# Patient Record
Sex: FEMALE | Race: ASIAN | NOT HISPANIC OR LATINO | URBAN - METROPOLITAN AREA
[De-identification: names, ages, dates, MRNs, and addresses within clinical notes are randomized per-mention and may not be internally consistent; named-entity substitution may affect disease eponyms.]

---

## 2017-06-01 ENCOUNTER — OUTPATIENT (OUTPATIENT)
Dept: OUTPATIENT SERVICES | Facility: HOSPITAL | Age: 52
LOS: 1 days | End: 2017-06-01
Payer: MEDICAID

## 2017-06-13 DIAGNOSIS — R69 ILLNESS, UNSPECIFIED: ICD-10-CM

## 2017-08-01 PROCEDURE — G9001: CPT

## 2022-09-20 ENCOUNTER — EMERGENCY (EMERGENCY)
Facility: HOSPITAL | Age: 57
LOS: 1 days | Discharge: HOME | End: 2022-09-20
Attending: EMERGENCY MEDICINE | Admitting: EMERGENCY MEDICINE

## 2022-09-20 VITALS
HEART RATE: 86 BPM | DIASTOLIC BLOOD PRESSURE: 104 MMHG | SYSTOLIC BLOOD PRESSURE: 180 MMHG | TEMPERATURE: 98 F | RESPIRATION RATE: 18 BRPM | OXYGEN SATURATION: 100 % | HEIGHT: 65 IN

## 2022-09-20 LAB
ALBUMIN SERPL ELPH-MCNC: 4.7 G/DL — SIGNIFICANT CHANGE UP (ref 3.3–5)
ALP SERPL-CCNC: 47 U/L — SIGNIFICANT CHANGE UP (ref 40–120)
ALT FLD-CCNC: 18 U/L — SIGNIFICANT CHANGE UP (ref 4–33)
ANION GAP SERPL CALC-SCNC: 11 MMOL/L — SIGNIFICANT CHANGE UP (ref 7–14)
APPEARANCE UR: CLEAR — SIGNIFICANT CHANGE UP
AST SERPL-CCNC: 22 U/L — SIGNIFICANT CHANGE UP (ref 4–32)
BASOPHILS # BLD AUTO: 0.04 K/UL — SIGNIFICANT CHANGE UP (ref 0–0.2)
BASOPHILS NFR BLD AUTO: 0.8 % — SIGNIFICANT CHANGE UP (ref 0–2)
BILIRUB SERPL-MCNC: 0.3 MG/DL — SIGNIFICANT CHANGE UP (ref 0.2–1.2)
BILIRUB UR-MCNC: NEGATIVE — SIGNIFICANT CHANGE UP
BLOOD GAS VENOUS COMPREHENSIVE RESULT: SIGNIFICANT CHANGE UP
BUN SERPL-MCNC: 17 MG/DL — SIGNIFICANT CHANGE UP (ref 7–23)
CALCIUM SERPL-MCNC: 9.1 MG/DL — SIGNIFICANT CHANGE UP (ref 8.4–10.5)
CHLORIDE SERPL-SCNC: 103 MMOL/L — SIGNIFICANT CHANGE UP (ref 98–107)
CO2 SERPL-SCNC: 24 MMOL/L — SIGNIFICANT CHANGE UP (ref 22–31)
COLOR SPEC: SIGNIFICANT CHANGE UP
CREAT SERPL-MCNC: 0.58 MG/DL — SIGNIFICANT CHANGE UP (ref 0.5–1.3)
DIFF PNL FLD: ABNORMAL
EGFR: 104 ML/MIN/1.73M2 — SIGNIFICANT CHANGE UP
EOSINOPHIL # BLD AUTO: 0.14 K/UL — SIGNIFICANT CHANGE UP (ref 0–0.5)
EOSINOPHIL NFR BLD AUTO: 2.8 % — SIGNIFICANT CHANGE UP (ref 0–6)
FLUAV AG NPH QL: SIGNIFICANT CHANGE UP
FLUBV AG NPH QL: SIGNIFICANT CHANGE UP
GLUCOSE SERPL-MCNC: 111 MG/DL — HIGH (ref 70–99)
GLUCOSE UR QL: NEGATIVE — SIGNIFICANT CHANGE UP
HCT VFR BLD CALC: 37.8 % — SIGNIFICANT CHANGE UP (ref 34.5–45)
HGB BLD-MCNC: 12.4 G/DL — SIGNIFICANT CHANGE UP (ref 11.5–15.5)
IANC: 2.3 K/UL — SIGNIFICANT CHANGE UP (ref 1.8–7.4)
IMM GRANULOCYTES NFR BLD AUTO: 0.4 % — SIGNIFICANT CHANGE UP (ref 0–0.9)
KETONES UR-MCNC: NEGATIVE — SIGNIFICANT CHANGE UP
LEUKOCYTE ESTERASE UR-ACNC: NEGATIVE — SIGNIFICANT CHANGE UP
LIDOCAIN IGE QN: 40 U/L — SIGNIFICANT CHANGE UP (ref 7–60)
LYMPHOCYTES # BLD AUTO: 1.96 K/UL — SIGNIFICANT CHANGE UP (ref 1–3.3)
LYMPHOCYTES # BLD AUTO: 39.8 % — SIGNIFICANT CHANGE UP (ref 13–44)
MAGNESIUM SERPL-MCNC: 2.2 MG/DL — SIGNIFICANT CHANGE UP (ref 1.6–2.6)
MCHC RBC-ENTMCNC: 28 PG — SIGNIFICANT CHANGE UP (ref 27–34)
MCHC RBC-ENTMCNC: 32.8 GM/DL — SIGNIFICANT CHANGE UP (ref 32–36)
MCV RBC AUTO: 85.3 FL — SIGNIFICANT CHANGE UP (ref 80–100)
MONOCYTES # BLD AUTO: 0.47 K/UL — SIGNIFICANT CHANGE UP (ref 0–0.9)
MONOCYTES NFR BLD AUTO: 9.5 % — SIGNIFICANT CHANGE UP (ref 2–14)
NEUTROPHILS # BLD AUTO: 2.3 K/UL — SIGNIFICANT CHANGE UP (ref 1.8–7.4)
NEUTROPHILS NFR BLD AUTO: 46.7 % — SIGNIFICANT CHANGE UP (ref 43–77)
NITRITE UR-MCNC: NEGATIVE — SIGNIFICANT CHANGE UP
NRBC # BLD: 0 /100 WBCS — SIGNIFICANT CHANGE UP (ref 0–0)
NRBC # FLD: 0 K/UL — SIGNIFICANT CHANGE UP (ref 0–0)
NT-PROBNP SERPL-SCNC: 19 PG/ML — SIGNIFICANT CHANGE UP
PH UR: 6 — SIGNIFICANT CHANGE UP (ref 5–8)
PLATELET # BLD AUTO: 205 K/UL — SIGNIFICANT CHANGE UP (ref 150–400)
POTASSIUM SERPL-MCNC: 4.3 MMOL/L — SIGNIFICANT CHANGE UP (ref 3.5–5.3)
POTASSIUM SERPL-SCNC: 4.3 MMOL/L — SIGNIFICANT CHANGE UP (ref 3.5–5.3)
PROT SERPL-MCNC: 7.1 G/DL — SIGNIFICANT CHANGE UP (ref 6–8.3)
PROT UR-MCNC: NEGATIVE — SIGNIFICANT CHANGE UP
RBC # BLD: 4.43 M/UL — SIGNIFICANT CHANGE UP (ref 3.8–5.2)
RBC # FLD: 12.6 % — SIGNIFICANT CHANGE UP (ref 10.3–14.5)
RSV RNA NPH QL NAA+NON-PROBE: SIGNIFICANT CHANGE UP
SARS-COV-2 RNA SPEC QL NAA+PROBE: SIGNIFICANT CHANGE UP
SODIUM SERPL-SCNC: 138 MMOL/L — SIGNIFICANT CHANGE UP (ref 135–145)
SP GR SPEC: 1.02 — SIGNIFICANT CHANGE UP (ref 1.01–1.05)
TROPONIN T, HIGH SENSITIVITY RESULT: <6 NG/L — SIGNIFICANT CHANGE UP
TROPONIN T, HIGH SENSITIVITY RESULT: <6 NG/L — SIGNIFICANT CHANGE UP
UROBILINOGEN FLD QL: SIGNIFICANT CHANGE UP
WBC # BLD: 4.93 K/UL — SIGNIFICANT CHANGE UP (ref 3.8–10.5)
WBC # FLD AUTO: 4.93 K/UL — SIGNIFICANT CHANGE UP (ref 3.8–10.5)

## 2022-09-20 PROCEDURE — 74177 CT ABD & PELVIS W/CONTRAST: CPT | Mod: 26,MA

## 2022-09-20 PROCEDURE — 71045 X-RAY EXAM CHEST 1 VIEW: CPT | Mod: 26

## 2022-09-20 PROCEDURE — 99220: CPT | Mod: 25

## 2022-09-20 PROCEDURE — 93010 ELECTROCARDIOGRAM REPORT: CPT

## 2022-09-20 RX ORDER — ONDANSETRON 8 MG/1
4 TABLET, FILM COATED ORAL ONCE
Refills: 0 | Status: COMPLETED | OUTPATIENT
Start: 2022-09-20 | End: 2022-09-20

## 2022-09-20 RX ORDER — LIDOCAINE 4 G/100G
1 CREAM TOPICAL ONCE
Refills: 0 | Status: COMPLETED | OUTPATIENT
Start: 2022-09-20 | End: 2022-09-21

## 2022-09-20 RX ORDER — ACETAMINOPHEN 500 MG
650 TABLET ORAL ONCE
Refills: 0 | Status: COMPLETED | OUTPATIENT
Start: 2022-09-20 | End: 2022-09-20

## 2022-09-20 RX ORDER — FAMOTIDINE 10 MG/ML
20 INJECTION INTRAVENOUS ONCE
Refills: 0 | Status: COMPLETED | OUTPATIENT
Start: 2022-09-20 | End: 2022-09-20

## 2022-09-20 RX ORDER — ACETAMINOPHEN 500 MG
975 TABLET ORAL ONCE
Refills: 0 | Status: COMPLETED | OUTPATIENT
Start: 2022-09-20 | End: 2022-09-20

## 2022-09-20 RX ORDER — CYCLOBENZAPRINE HYDROCHLORIDE 10 MG/1
10 TABLET, FILM COATED ORAL ONCE
Refills: 0 | Status: COMPLETED | OUTPATIENT
Start: 2022-09-20 | End: 2022-09-20

## 2022-09-20 RX ORDER — ATORVASTATIN CALCIUM 80 MG/1
80 TABLET, FILM COATED ORAL AT BEDTIME
Refills: 0 | Status: DISCONTINUED | OUTPATIENT
Start: 2022-09-20 | End: 2022-09-23

## 2022-09-20 RX ORDER — SODIUM CHLORIDE 9 MG/ML
1000 INJECTION INTRAMUSCULAR; INTRAVENOUS; SUBCUTANEOUS ONCE
Refills: 0 | Status: COMPLETED | OUTPATIENT
Start: 2022-09-20 | End: 2022-09-20

## 2022-09-20 RX ADMIN — Medication 650 MILLIGRAM(S): at 20:14

## 2022-09-20 RX ADMIN — FAMOTIDINE 20 MILLIGRAM(S): 10 INJECTION INTRAVENOUS at 07:05

## 2022-09-20 RX ADMIN — Medication 30 MILLILITER(S): at 07:06

## 2022-09-20 RX ADMIN — Medication 975 MILLIGRAM(S): at 07:05

## 2022-09-20 RX ADMIN — Medication 975 MILLIGRAM(S): at 08:02

## 2022-09-20 RX ADMIN — SODIUM CHLORIDE 1000 MILLILITER(S): 9 INJECTION INTRAMUSCULAR; INTRAVENOUS; SUBCUTANEOUS at 06:48

## 2022-09-20 RX ADMIN — Medication 650 MILLIGRAM(S): at 18:54

## 2022-09-20 RX ADMIN — SODIUM CHLORIDE 1000 MILLILITER(S): 9 INJECTION INTRAMUSCULAR; INTRAVENOUS; SUBCUTANEOUS at 08:21

## 2022-09-20 RX ADMIN — ONDANSETRON 4 MILLIGRAM(S): 8 TABLET, FILM COATED ORAL at 07:05

## 2022-09-20 RX ADMIN — ATORVASTATIN CALCIUM 80 MILLIGRAM(S): 80 TABLET, FILM COATED ORAL at 22:58

## 2022-09-20 NOTE — ED CDU PROVIDER INITIAL DAY NOTE - DETAILS
57yF w/pmhx HLD, (per chart HTN, GERD although pt denies) presenting with left sided lower chest/breast pain x 10 days, worse since last night a/w nausea.   In main ED EKG non ischemic, trop <6, CXR normal, CT abd/pelvis without acute findings  Sent to CDU for stress test, echo, tele doc evaluation and cardiac monitoring

## 2022-09-20 NOTE — ED PROVIDER NOTE - PHYSICAL EXAMINATION
GENERAL: well appearing in no acute distress, non-toxic appearing  HEAD: normocephalic, atraumatic  HEENT: normal conjunctiva, oral mucosa moist  CARDIAC: regular rate and rhythm, normal S1S2, no appreciable murmurs, 2+ pulses in LE b/l  PULM: normal breath sounds, clear to ascultation bilaterally, no rales, rhonchi, wheezing  GI: abdomen nondistended, soft, tender to palpation in LUQ and epigastrium, no guarding, rebound tenderness  : right CVA tenderness, no left CVA tenderness, no suprapubic tenderness  NEURO: moving all 4 extremities, no focal deficits, normal speech, AAOx3   MSK: trace peripheral edema, no calf tenderness b/l  SKIN: well-perfused, extremities warm, no visible rashes  PSYCH: appropriate mood and affect

## 2022-09-20 NOTE — ED CDU PROVIDER INITIAL DAY NOTE - OBJECTIVE STATEMENT
57yF w/pmhx HLD, (per chart HTN, GERD although pt denies) presenting with left sided lower chest/breast pain x 10 days. Pt reports she was having intermittent pain to the area, worsened last night, became constant. pain is worse with movement and with palpitation. Associated pt reports nausea. Pt denies fever/chills, skin changes to area, injury, vomiting, diarrhea, sob, palpitations, near syncope 57yF w/pmhx HLD, (per chart HTN, GERD although pt denies) presenting with left sided lower chest/breast pain x 10 days. Pt reports she was having intermittent pain to the area, worsened last night, became constant. pain is worse with movement and with palpitation. Associated pt reports nausea. Pt denies fever/chills, skin changes to area, injury, vomiting, diarrhea, sob, palpitations, near syncope, diaphoresis, leg pain or swelling, recent travel or any other concerns. Pt is Italian speaking  providing translation  In main ED EKG non ischemic, trop <6, CXR normal, CT abd/pelvis without acute findings  Sent to CDU for stress test, echo, tele doc evaluation and cardiac monitoring

## 2022-09-20 NOTE — ED ADULT NURSE NOTE - NSIMPLEMENTINTERV_GEN_ALL_ED
Implemented All Universal Safety Interventions:  [place] to call system. Call bell, personal items and telephone within reach. Instruct patient to call for assistance. Room bathroom lighting operational. Non-slip footwear when patient is off stretcher. Physically safe environment: no spills, clutter or unnecessary equipment. Stretcher in lowest position, wheels locked, appropriate side rails in place.

## 2022-09-20 NOTE — ED CDU PROVIDER INITIAL DAY NOTE - ATTENDING APP SHARED VISIT CONTRIBUTION OF CARE
Farzad JOSEPH: Agree with above - pt with over 1 week of LUQ abd/chest pain, now worsening.  No e/o intraabd pathology on initial eval incl ct; obs in cdu to r/o acs - plan for tele monitoring, serial trop, echo/stress.

## 2022-09-20 NOTE — ED PROVIDER NOTE - CLINICAL SUMMARY MEDICAL DECISION MAKING FREE TEXT BOX
Ariadna PGY1 - 58 yo F with h/o HTN, HLD, GERD, migraines p/w sharp LUQ pain radiating to the back.  Concern for ACS vs GERD vs pancreatitis, cholecystitis or hiatal hernia.  Labs, ekg, cxr, trop, bnp, lipase, ua/ucx, CT a/p ordered.

## 2022-09-20 NOTE — ED CDU PROVIDER INITIAL DAY NOTE - NS ED ATTENDING STATEMENT MOD
This was a shared visit with the MELODIE. I reviewed and verified the documentation and independently performed the documented:

## 2022-09-20 NOTE — ED ADULT TRIAGE NOTE - CHIEF COMPLAINT QUOTE
pt c/o L side chest pain x10 days worsening today now with SOB. Pt appears uncomfortable, denies n/v, weakness. Hx HLD, GERD

## 2022-09-20 NOTE — ED PROVIDER NOTE - OBJECTIVE STATEMENT
Ariadna PGY1 - 58 yo F with h/o HTN, HLD, GERD, migraines p/w sharp LUQ pain radiating to the back.  Patient's  translating, reports patient has had intermittent pain for the past 10 days that has increased in severity, peaking last night when she was unable to sleep.  Pain with inspiration, nausea.  No vomiting, diarrhea, LBM yesterday.  Took tylenol for pain last night w/o improvement. Some burning with urination,  reports patient has recently had recurrent UTIs.  Chronic trace peripheral edema.  No fever/chills.

## 2022-09-20 NOTE — ED ADULT NURSE REASSESSMENT NOTE - NS ED NURSE REASSESS COMMENT FT1
Patient ambulated to the bathroom with a steady gait. Denies change in discomfort or pain after returning from the bathroom. Denies SOB, chest pain, dizziness or change in gait. SABRA FLEMING

## 2022-09-20 NOTE — ED PROVIDER NOTE - NSICDXPASTMEDICALHX_GEN_ALL_CORE_FT
PAST MEDICAL HISTORY:  Chronic Sinus Infection     GERD (gastroesophageal reflux disease)     HLD (Hyperlipidemia)     Incontinence Stress and urge    Migraine headache

## 2022-09-20 NOTE — ED ADULT NURSE NOTE - OBJECTIVE STATEMENT
Pt.'s  states that pt. has had right pain under breast for a week. MD evaluation in progress. Pt. appears well.

## 2022-09-21 VITALS
DIASTOLIC BLOOD PRESSURE: 61 MMHG | RESPIRATION RATE: 18 BRPM | OXYGEN SATURATION: 100 % | SYSTOLIC BLOOD PRESSURE: 144 MMHG | TEMPERATURE: 98 F | HEART RATE: 68 BPM

## 2022-09-21 LAB
CULTURE RESULTS: SIGNIFICANT CHANGE UP
D DIMER BLD IA.RAPID-MCNC: <150 NG/ML DDU — SIGNIFICANT CHANGE UP
SPECIMEN SOURCE: SIGNIFICANT CHANGE UP
TROPONIN T, HIGH SENSITIVITY RESULT: <6 NG/L — SIGNIFICANT CHANGE UP

## 2022-09-21 PROCEDURE — 93018 CV STRESS TEST I&R ONLY: CPT | Mod: GC

## 2022-09-21 PROCEDURE — 93016 CV STRESS TEST SUPVJ ONLY: CPT | Mod: GC

## 2022-09-21 PROCEDURE — 78452 HT MUSCLE IMAGE SPECT MULT: CPT | Mod: 26,MA

## 2022-09-21 PROCEDURE — 99217: CPT

## 2022-09-21 PROCEDURE — 93306 TTE W/DOPPLER COMPLETE: CPT | Mod: 26

## 2022-09-21 RX ORDER — ACETAMINOPHEN 500 MG
650 TABLET ORAL ONCE
Refills: 0 | Status: COMPLETED | OUTPATIENT
Start: 2022-09-21 | End: 2022-09-21

## 2022-09-21 RX ORDER — ACETAMINOPHEN 500 MG
1000 TABLET ORAL ONCE
Refills: 0 | Status: COMPLETED | OUTPATIENT
Start: 2022-09-21 | End: 2022-09-21

## 2022-09-21 RX ADMIN — LIDOCAINE 1 PATCH: 4 CREAM TOPICAL at 12:22

## 2022-09-21 RX ADMIN — Medication 1000 MILLIGRAM(S): at 01:28

## 2022-09-21 RX ADMIN — LIDOCAINE 1 PATCH: 4 CREAM TOPICAL at 00:36

## 2022-09-21 RX ADMIN — Medication 400 MILLIGRAM(S): at 00:58

## 2022-09-21 RX ADMIN — Medication 650 MILLIGRAM(S): at 06:51

## 2022-09-21 NOTE — ED CDU PROVIDER DISPOSITION NOTE - CLINICAL COURSE
57yF w/pmhx HLD who presented to ED c/o Lt side chest pain that is underneath breast and radiates toward MCL. Pt has had intermittent sx for past few days but worse last night. Noted +nausea. SX exacerbated with change in position. In main ED EKG non ischemic, trop <6, CXR normal, CT abd/pelvis without acute findings. Pt transferred to CDU for; tele, stress, echo, tele doc evaluation.    rpt trop, ecg and d-dimer ordered which were all normal. Lidocaine patch ordered and tylenol which provided relief.  Echo and stress wnl. Patient seen by cards and 57yF w/pmhx HLD who presented to ED c/o Lt side chest pain that is underneath breast and radiates toward MCL. Pt has had intermittent sx for past few days but worse last night. Noted +nausea. SX exacerbated with change in position. In main ED EKG non ischemic, trop <6, CXR normal, CT abd/pelvis without acute findings. Pt transferred to CDU for; tele, stress, echo, tele doc evaluation.    rpt trop, ecg and d-dimer ordered which were all normal. Lidocaine patch ordered and tylenol which provided relief.  Echo and stress wnl. Patient seen by cards and cleared to go. likely MSK. spoke with patient pharmacist she has used naproxen in past without issue, sent 500mg DR to pharmacy

## 2022-09-21 NOTE — ED CDU PROVIDER SUBSEQUENT DAY NOTE - MEDICAL DECISION MAKING DETAILS
Pt is a 57yF w/pmhx HLD who presented to ED c/o Lt side chest pain that is underneath breast and radiates toward MCL. Pt has had intermittent sx for past few days but worse last night. Noted +nausea. SX exacerbated with change in position. In main ED EKG non ischemic, trop <6, CXR normal, CT abd/pelvis without acute findings. Pt transferred to CDU for; tele, stress, echo, tele doc evaluation.

## 2022-09-21 NOTE — ED CDU PROVIDER DISPOSITION NOTE - NSFOLLOWUPINSTRUCTIONS_ED_ALL_ED_FT
FOLLOW UP WITH CARDIOLOGIST AND/ OR YOUR PRIMARY MEDICAL DOCTOR IN 2-3 DAYS OR WITHIN THE WEEK. SHOW COPIES OF YOUR RESULTS/REPORTS GIVEN TO YOU.      Take Tylenol 650mg every 4-6 hours as needed for pain.   Take all of your other medications as previously prescribed.     Worsening or continued chest pain, shortness of breath, weakness, return to ED.

## 2022-09-21 NOTE — ED CDU PROVIDER DISPOSITION NOTE - NS_EDPROVIDERDISPOUSERTYPE_ED_A_ED
Post Anesthesia Care Unit [address] [address]    Phone:  [phone]                                       After Visit Summary   6/9/2017    Daniel Sandhu    MRN: [id number]           After Visit Summary Signature Page     I have received my discharge instructions, and my questions have been answered. I have discussed any challenges I see with this plan with the nurse or doctor.    ..........................................................................................................................................  Patient/Patient Representative Signature      ..........................................................................................................................................  Patient Representative Print Name and Relationship to Patient    ..................................................               ................................................  Date                                            Time    ..........................................................................................................................................  Reviewed by Signature/Title    ...................................................              ..............................................  Date                                                            Time           Attending Attestation (For Attendings USE Only)...

## 2022-09-21 NOTE — ED CDU PROVIDER SUBSEQUENT DAY NOTE - HISTORY
Pt is a 57yF w/pmhx HLD who presented to ED c/o Lt side chest pain that is underneath breast and radiates toward MCL. Pt has had intermittent sx for past few days but worse last night. Noted +nausea. SX exacerbated with change in position. In main ED EKG non ischemic, trop <6, CXR normal, CT abd/pelvis without acute findings. Pt transferred to CDU for; tele, stress, echo, tele doc evaluation.     In interim- Pt resting comfortably. Did have episode of CP earlier in evening, rpt trop, ecg and d-dimer ordered which were all normal. Lidocaine patch ordered and tylenol which provided +relief. Pt pending stress, echo and tele doc eval this am. Vitals stable. No events on tele.

## 2022-09-21 NOTE — ED CDU PROVIDER SUBSEQUENT DAY NOTE - PHYSICAL EXAMINATION
Vital signs reviewed.   CONSTITUTIONAL; in no apparent distress. Non-toxic appearing.   HEAD: Normocephalic, atraumatic.  EYES: PERRL,  conjunctiva and sclera WNL.  CARD: Normal S1, S2; no murmurs, + TTP noted to anterior chest wall underneath Lt breast. No bruising, redness or rashes noted.   RESP: Normal chest excursion with respiration; breath sounds clear and equal bilaterally; no wheezes, rhonchi, or rales.  ABD/GI: soft, non-distended; non-tender;  EXT/MS: moves all extremities;   SKIN: Normal for age and race  NEURO: Awake, alert, oriented x 3, no gross deficits  PSYCH: Normal mood; appropriate affect.

## 2022-09-21 NOTE — ED ADULT NURSE REASSESSMENT NOTE - NS ED NURSE REASSESS COMMENT FT1
Pt is A&Ox4, ambulatory, c/o 8 out of 10 pain under left breast. Pt stated that pain is mild when resting and intensifies with movement. pt denied palpitations but reported slight SOB. KEVIN Preston was informed., PA assessed pt at bedside,  ECG, labs obtained. safety maintained, call bell with in reach Pt is A&Ox4, ambulatory, c/o 8 out of 10 pain under left breast. Pt stated that pain is mild when resting and intensifies with movement. pt denied palpitations but reported slight SOB. KEVIN Preston was informed., PA assessed pt at bedside,  ECG, labs obtained. safety maintained, call bell with in reach.   [employer]  Cole Martin ID#[id number] used.

## 2022-09-21 NOTE — ED CDU PROVIDER DISPOSITION NOTE - PATIENT PORTAL LINK FT
You can access the FollowMyHealth Patient Portal offered by [facility] by registering at the following website: [email or web address]. By joining [employer]’s FollowMyHealth portal, you will also be able to view your health information using other applications (apps) compatible with our system.

## 2022-09-21 NOTE — CONSULT NOTE ADULT - SUBJECTIVE AND OBJECTIVE BOX
C A R D I O L O G Y  *********************    DATE OF SERVICE: 09-21-22    HISTORY OF PRESENT ILLNESS: HPI:   Pt is a 57 year old female with a pmh of HLD who presented from home with chest discomfort.    She states she has had pain under her left breast for the last 10 days. It is not associated with exertion, no SOB, no diaphoresis or nausea. She states it is tender to palpation Denies any recent travel, pain does not change with position or does not worsen with deep breaths. No recent fevers or sick contacts. Denies palpitations orthopnea, PND, LE edema. Pain does not get worse with eating    PAST MEDICAL & SURGICAL HISTORY:  Chronic Sinus Infection  HLD (Hyperlipidemia)  GERD (gastroesophageal reflux disease)  Migraine headache  Incontinence Stress and urge  History of hysterectomy 2003    MEDICATIONS:  MEDICATIONS  (STANDING):  atorvastatin 80 milliGRAM(s) Oral at bedtime    Allergies No Known Allergies    FAMILY HISTORY:  No pertinent family history in first degree relatives    SOCIAL HISTORY:    [X ] Non-smoker  [ ] Smoker  [ ] Alcohol      REVIEW OF SYSTEMS:  [ ]chest pain  [  ]shortness of breath  [  ]palpitations  [  ]syncope  [ ]near syncope [ ]upper extremity weakness   [ ] lower extremity weakness  [  ]diplopia  [  ]altered mental status   [  ]fevers  [ ]chills [ ]nausea  [ ]vomiting  [  ]dysphagia    [ ]abdominal pain  [ ]melena  [ ]BRBPR    [  ]epistaxis  [  ]rash    [ ]lower extremity edema    [X] All others negative	  [ ] Unable to obtain      LABS:	 	    CARDIAC MARKERS:                      12.4   4.93  )-----------( 205      ( 20 Sep 2022 06:57 )             37.8     Hb Trend:     09-20    138  |  103  |  17  ----------------------------<  111<H>  4.3   |  24  |  0.58    Ca    9.1      20 Sep 2022 06:57  Mg     2.20     09-20    TPro  7.1  /  Alb  4.7  /  TBili  0.3  /  DBili  x   /  AST  22  /  ALT  18  /  AlkPhos  47  09-20    Creatinine Trend: 0.58<--      PHYSICAL EXAM:  T(C): 36.6 (09-21-22 @ 16:18), Max: 36.9 (09-21-22 @ 01:39)  HR: 68 (09-21-22 @ 16:18) (66 - 71)  BP: 144/61 (09-21-22 @ 16:18) (116/67 - 144/61)  RR: 18 (09-21-22 @ 16:18) (17 - 18)  SpO2: 100% (09-21-22 @ 16:18) (96% - 100%)  Wt(kg): --     I&O's Summary    General: Well nourished in no acute distress. Alert and Oriented * 3.   Head: Normocephalic and atraumatic.   Neck: No JVD. No bruits. Supple. Does not appear to be enlarged.   Cardiovascular: + S1,S2 ; RRR Soft systolic murmur at the left lower sternal border.   Lungs: CTA b/l. No rhonchi, rales or wheezes.   Abdomen: + BS, soft. Non tender. Non distended. No rebound. No guarding.   Extremities: No clubbing/cyanosis/edema.   Neurologic: Moves all four extremities. Full range of motion.   Skin: Warm and moist. The patient's skin has normal elasticity and good skin turgor.   Psychiatric: Appropriate mood and affect.  Musculoskeletal: tender to palaption in subxiphoid area    TELEMETRY: 	NSR     ECG:  	Normal Sinus Rhytm 1st degree with non specific ST changes    RADIOLOGY:  CXR:   FINDINGS:  Heart/Vascular: The cardiac silhouette isnormal in size.  Pulmonary: No focal consolidation, pleural effusion or pneumothorax.  Bones: The visualized osseous structures are unremarkable.    Impression:  Clear lungs.    TTE:  DIMENSIONS:  Dimensions:     Normal Values:  LA:     4.0 cm    2.0 - 4.0 cm  Ao:     2.9 cm    2.0 - 3.8 cm  SEPTUM: 0.9 cm    0.6 - 1.2 cm  PWT:   0.8 cm    0.6 - 1.1 cm  LVIDd:  5.5 cm    3.0 - 5.6 cm  LVIDs:    ---     1.8 - 4.0 cm  Derived Variables:  LVMI: 88 g/m2  RWT: 0.29  Ejection Fraction (Modified Pastrana Rule): 63 %  ------------------------------------------------------------------------  OBSERVATIONS:  Mitral Valve: Normal mitral valve.  Aortic Root: Normal aortic root.  Aortic Valve: Normal trileaflet aortic valve.  Left Atrium: Normal left atrium.  LA volume index = 16  cc/m2.  Left Ventricle: Normal left ventricular systolic function.  No segmental wall motion abnormalities. Normal left  ventricular internal dimensions and wall thicknesses.  (DT:173 ms).  Right Heart: Normal right atrium. Normal right ventricular  size and function. Normal tricuspid valve. Mild tricuspid  regurgitation. Normal pulmonic valve.  Pericardium/PleuraNormal pericardium with no pericardial  effusion.  Hemodynamic: Estimated right ventricular systolic pressure  equals 26 mm Hg, assuming right atrial pressure equals 10  mm Hg, consistent with normal pulmonary pressures.  ------------------------------------------------------------------------  CONCLUSIONS:  1. Normal trileaflet aortic valve.  2. Normal left ventricular internal dimensions and wall  thicknesses.  3. Normal left ventricular systolic function. No segmental  wall motion abnormalities.  4. Normal right ventricular size and function.      Stress:  PRESSIONS:Normal Study  * Myocardial Perfusion SPECT results are normal.  * Review of raw data shows: The study is of adequate  technical quality due to overlying soft tissue  attenuation.  * The left ventricle was normal in size. Normal myocardial  perfusion scan,with no evidence of infarction or inducible  ischemia.  * Post-stress gated wall motion analysis was performed  (LVEF = 67 %;LVEDV = 71 ml.), revealing normal LV systolic  function. No segmental wall motion abnormalities. RV  systolic function appeared normal.        ASSESSMENT/PLAN: 	57y Female with HLD presented with subxiphoid and left sided chest discomfort for ten days.    - non cardiac chest pain, likely musculo skeletal as she is tender on palpation  - EKG, Stress, echo all non-ischemic  - Advice anti-inflammatories for MSK pain per ED  - can go home from cardiac perspective    Maurizio Pickard MD  Pager: [phone]

## 2022-10-25 ENCOUNTER — EMERGENCY (EMERGENCY)
Facility: HOSPITAL | Age: 57
LOS: 1 days | Discharge: HOME | End: 2022-10-25
Attending: STUDENT IN AN ORGANIZED HEALTH CARE EDUCATION/TRAINING PROGRAM | Admitting: EMERGENCY MEDICINE

## 2022-10-25 VITALS
RESPIRATION RATE: 18 BRPM | TEMPERATURE: 98 F | HEIGHT: 65 IN | SYSTOLIC BLOOD PRESSURE: 159 MMHG | OXYGEN SATURATION: 98 % | HEART RATE: 88 BPM | DIASTOLIC BLOOD PRESSURE: 80 MMHG

## 2022-10-25 VITALS
DIASTOLIC BLOOD PRESSURE: 72 MMHG | TEMPERATURE: 98 F | HEART RATE: 84 BPM | RESPIRATION RATE: 16 BRPM | OXYGEN SATURATION: 100 % | SYSTOLIC BLOOD PRESSURE: 148 MMHG

## 2022-10-25 LAB
ALBUMIN SERPL ELPH-MCNC: 4.6 G/DL — SIGNIFICANT CHANGE UP (ref 3.3–5)
ALP SERPL-CCNC: 54 U/L — SIGNIFICANT CHANGE UP (ref 40–120)
ALT FLD-CCNC: 23 U/L — SIGNIFICANT CHANGE UP (ref 4–33)
ANION GAP SERPL CALC-SCNC: 12 MMOL/L — SIGNIFICANT CHANGE UP (ref 7–14)
APPEARANCE UR: ABNORMAL
AST SERPL-CCNC: 20 U/L — SIGNIFICANT CHANGE UP (ref 4–32)
BACTERIA # UR AUTO: ABNORMAL
BASOPHILS # BLD AUTO: 0.04 K/UL — SIGNIFICANT CHANGE UP (ref 0–0.2)
BASOPHILS NFR BLD AUTO: 0.6 % — SIGNIFICANT CHANGE UP (ref 0–2)
BILIRUB SERPL-MCNC: 0.3 MG/DL — SIGNIFICANT CHANGE UP (ref 0.2–1.2)
BILIRUB UR-MCNC: NEGATIVE — SIGNIFICANT CHANGE UP
BUN SERPL-MCNC: 10 MG/DL — SIGNIFICANT CHANGE UP (ref 7–23)
CALCIUM SERPL-MCNC: 9.6 MG/DL — SIGNIFICANT CHANGE UP (ref 8.4–10.5)
CHLORIDE SERPL-SCNC: 104 MMOL/L — SIGNIFICANT CHANGE UP (ref 98–107)
CO2 SERPL-SCNC: 26 MMOL/L — SIGNIFICANT CHANGE UP (ref 22–31)
COLOR SPEC: YELLOW — SIGNIFICANT CHANGE UP
CREAT SERPL-MCNC: 0.51 MG/DL — SIGNIFICANT CHANGE UP (ref 0.5–1.3)
DIFF PNL FLD: ABNORMAL
EGFR: 107 ML/MIN/1.73M2 — SIGNIFICANT CHANGE UP
EOSINOPHIL # BLD AUTO: 0.08 K/UL — SIGNIFICANT CHANGE UP (ref 0–0.5)
EOSINOPHIL NFR BLD AUTO: 1.2 % — SIGNIFICANT CHANGE UP (ref 0–6)
EPI CELLS # UR: SIGNIFICANT CHANGE UP
FLUAV AG NPH QL: SIGNIFICANT CHANGE UP
FLUBV AG NPH QL: SIGNIFICANT CHANGE UP
GLUCOSE SERPL-MCNC: 100 MG/DL — HIGH (ref 70–99)
GLUCOSE UR QL: NEGATIVE — SIGNIFICANT CHANGE UP
HCT VFR BLD CALC: 38.2 % — SIGNIFICANT CHANGE UP (ref 34.5–45)
HGB BLD-MCNC: 12.8 G/DL — SIGNIFICANT CHANGE UP (ref 11.5–15.5)
IANC: 3.87 K/UL — SIGNIFICANT CHANGE UP (ref 1.8–7.4)
IMM GRANULOCYTES NFR BLD AUTO: 0.2 % — SIGNIFICANT CHANGE UP (ref 0–0.9)
KETONES UR-MCNC: NEGATIVE — SIGNIFICANT CHANGE UP
LEUKOCYTE ESTERASE UR-ACNC: ABNORMAL
LIDOCAIN IGE QN: 31 U/L — SIGNIFICANT CHANGE UP (ref 7–60)
LYMPHOCYTES # BLD AUTO: 2.14 K/UL — SIGNIFICANT CHANGE UP (ref 1–3.3)
LYMPHOCYTES # BLD AUTO: 32.5 % — SIGNIFICANT CHANGE UP (ref 13–44)
MCHC RBC-ENTMCNC: 28.4 PG — SIGNIFICANT CHANGE UP (ref 27–34)
MCHC RBC-ENTMCNC: 33.5 GM/DL — SIGNIFICANT CHANGE UP (ref 32–36)
MCV RBC AUTO: 84.9 FL — SIGNIFICANT CHANGE UP (ref 80–100)
MONOCYTES # BLD AUTO: 0.44 K/UL — SIGNIFICANT CHANGE UP (ref 0–0.9)
MONOCYTES NFR BLD AUTO: 6.7 % — SIGNIFICANT CHANGE UP (ref 2–14)
NEUTROPHILS # BLD AUTO: 3.87 K/UL — SIGNIFICANT CHANGE UP (ref 1.8–7.4)
NEUTROPHILS NFR BLD AUTO: 58.8 % — SIGNIFICANT CHANGE UP (ref 43–77)
NITRITE UR-MCNC: NEGATIVE — SIGNIFICANT CHANGE UP
NRBC # BLD: 0 /100 WBCS — SIGNIFICANT CHANGE UP (ref 0–0)
NRBC # FLD: 0 K/UL — SIGNIFICANT CHANGE UP (ref 0–0)
PH UR: 7 — SIGNIFICANT CHANGE UP (ref 5–8)
PLATELET # BLD AUTO: 222 K/UL — SIGNIFICANT CHANGE UP (ref 150–400)
POTASSIUM SERPL-MCNC: 4.5 MMOL/L — SIGNIFICANT CHANGE UP (ref 3.5–5.3)
POTASSIUM SERPL-SCNC: 4.5 MMOL/L — SIGNIFICANT CHANGE UP (ref 3.5–5.3)
PROT SERPL-MCNC: 7.4 G/DL — SIGNIFICANT CHANGE UP (ref 6–8.3)
PROT UR-MCNC: ABNORMAL
RBC # BLD: 4.5 M/UL — SIGNIFICANT CHANGE UP (ref 3.8–5.2)
RBC # FLD: 12.5 % — SIGNIFICANT CHANGE UP (ref 10.3–14.5)
RBC CASTS # UR COMP ASSIST: SIGNIFICANT CHANGE UP /HPF (ref 0–4)
RSV RNA NPH QL NAA+NON-PROBE: SIGNIFICANT CHANGE UP
SARS-COV-2 RNA SPEC QL NAA+PROBE: SIGNIFICANT CHANGE UP
SODIUM SERPL-SCNC: 142 MMOL/L — SIGNIFICANT CHANGE UP (ref 135–145)
SP GR SPEC: 1.01 — LOW (ref 1.01–1.05)
UROBILINOGEN FLD QL: SIGNIFICANT CHANGE UP
WBC # BLD: 6.58 K/UL — SIGNIFICANT CHANGE UP (ref 3.8–10.5)
WBC # FLD AUTO: 6.58 K/UL — SIGNIFICANT CHANGE UP (ref 3.8–10.5)
WBC UR QL: >50 /HPF — SIGNIFICANT CHANGE UP (ref 0–5)

## 2022-10-25 PROCEDURE — 74177 CT ABD & PELVIS W/CONTRAST: CPT | Mod: 26,MA

## 2022-10-25 PROCEDURE — 76705 ECHO EXAM OF ABDOMEN: CPT | Mod: 26

## 2022-10-25 PROCEDURE — 99285 EMERGENCY DEPT VISIT HI MDM: CPT

## 2022-10-25 RX ORDER — SODIUM CHLORIDE 9 MG/ML
1000 INJECTION INTRAMUSCULAR; INTRAVENOUS; SUBCUTANEOUS ONCE
Refills: 0 | Status: COMPLETED | OUTPATIENT
Start: 2022-10-25 | End: 2022-10-25

## 2022-10-25 RX ORDER — FAMOTIDINE 10 MG/ML
20 INJECTION INTRAVENOUS ONCE
Refills: 0 | Status: COMPLETED | OUTPATIENT
Start: 2022-10-25 | End: 2022-10-25

## 2022-10-25 RX ORDER — CEFTRIAXONE 500 MG/1
1000 INJECTION, POWDER, FOR SOLUTION INTRAMUSCULAR; INTRAVENOUS ONCE
Refills: 0 | Status: COMPLETED | OUTPATIENT
Start: 2022-10-25 | End: 2022-10-25

## 2022-10-25 RX ORDER — PHENAZOPYRIDINE HCL 100 MG
100 TABLET ORAL ONCE
Refills: 0 | Status: COMPLETED | OUTPATIENT
Start: 2022-10-25 | End: 2022-10-25

## 2022-10-25 RX ORDER — KETOROLAC TROMETHAMINE 30 MG/ML
15 SYRINGE (ML) INJECTION ONCE
Refills: 0 | Status: DISCONTINUED | OUTPATIENT
Start: 2022-10-25 | End: 2022-10-25

## 2022-10-25 RX ORDER — ONDANSETRON 8 MG/1
4 TABLET, FILM COATED ORAL ONCE
Refills: 0 | Status: COMPLETED | OUTPATIENT
Start: 2022-10-25 | End: 2022-10-25

## 2022-10-25 RX ADMIN — Medication 15 MILLIGRAM(S): at 16:59

## 2022-10-25 RX ADMIN — CEFTRIAXONE 100 MILLIGRAM(S): 500 INJECTION, POWDER, FOR SOLUTION INTRAMUSCULAR; INTRAVENOUS at 17:10

## 2022-10-25 RX ADMIN — SODIUM CHLORIDE 1000 MILLILITER(S): 9 INJECTION INTRAMUSCULAR; INTRAVENOUS; SUBCUTANEOUS at 16:56

## 2022-10-25 RX ADMIN — Medication 100 MILLIGRAM(S): at 17:09

## 2022-10-25 RX ADMIN — Medication 15 MILLIGRAM(S): at 17:29

## 2022-10-25 NOTE — ED PROVIDER NOTE - OBJECTIVE STATEMENT
57 y.o. female w/ PMHx HLD, GERD, and migraines and PSHx of hysterectomy (2003) presents to ED w/ c/o abdominal pain and hematuria.  Pt speaks only Croatian but prefers to have her  translate.  Pt reports she has been having increased dysuria w/ urinary frequency and urgency x one week.  Also endorsing constipation.  Hematuria began 5 days ago w/ occasional crystals in her urine.  Pain feels sharp and has worsened over the past week and accompanied by nausea w/o vomiting.  No fever, chills, vomiting, diarrhea, changes in appetite, dysphagia, chest pain, SOB, or syncope.

## 2022-10-25 NOTE — ED ADULT NURSE NOTE - NSIMPLEMENTINTERV_GEN_ALL_ED
Implemented All Fall with Harm Risk Interventions:  [place] to call system. Call bell, personal items and telephone within reach. Instruct patient to call for assistance. Room bathroom lighting operational. Non-slip footwear when patient is off stretcher. Physically safe environment: no spills, clutter or unnecessary equipment. Stretcher in lowest position, wheels locked, appropriate side rails in place. Provide visual cue, wrist band, yellow gown, etc. Monitor gait and stability. Monitor for mental status changes and reorient to person, place, and time. Review medications for side effects contributing to fall risk. Reinforce activity limits and safety measures with patient and family. Provide visual clues: red socks.

## 2022-10-25 NOTE — ED ADULT NURSE NOTE - OBJECTIVE STATEMENT
Received patient in Intake 8 c/o lower abdominal pain, hematuria today, patient denies fever, chills, SOB, chest pain. Patient is A&OX4, airway patent, breathing unlabored and even, radial pulses palpable, abdomen soft, tender. Labs obtained, 20G IV placed on left arm, medications given as ordered, IV fluid bolus infusing. Side rails up and safety maintained. Fall precaution in place. Family at bedside.

## 2022-10-25 NOTE — ED PROVIDER NOTE - NSFOLLOWUPINSTRUCTIONS_ED_ALL_ED_FT
You were seen in the emergency department for abdominal pain and blood in your urine.  You had labs, imaging and medications.  The results have been discussed with you and a copy of your results have been included with your discharge paperwork.  Your discharge diagnosis is cystitis.  Please read all attached patient information, read all additional instructions below, and follow-up with all providers as directed.    1) Follow-up with your primary care provider in 1-2 days.    2) Continue to take all medications as prescribed.  Finish all of the antibiotics even if you feel improvement.    Cefadroxil 1000mg - one table every 12 hours for 10 days.    3) Rest and stay hydrated. Pain can be managed with Acetaminophen (aka Tylenol) and Ibuprofen (aka Motrin or Advil) over the counter as directed.    4) Return to the ER for any new or worsening symptoms.      Please read all attached patient information.    Hemorrhagic Cystitis      Hemorrhagic cystitis is bleeding from damage to the inner lining of the bladder. This condition results when the inner lining of the bladder (transitional epithelium) is damaged along with the blood vessels that supply the area. Hemorrhagic cystitis may make it difficult or painful to pass urine.      What are the causes?    This condition may be caused by:•Damage from certain cancer treatments. This is the most common cause. It can result from:  •Radiation treatment that involves the bladder.      •Chemotherapy drugs used to treat certain cancers or to treat people who have a bone marrow transplant (cyclophosphamide and ifosfamide).        •Infections with bacteria or viruses, especially in people with a weak body defense system (immune system).      Rare causes of the condition include:  •Other drugs, including penicillin drugs and a type of steroid (danazol).      •Exposure to toxic chemicals used in dyes, markers, shoe polish, or pesticides.        What are the signs or symptoms?    The main sign of this condition is blood in the urine (hematuria). This can range from very mild to severe.  •Mild hematuria can include microscopic bleeding that does not change the color of your urine.      •Severe hematuria can cause you to have urine with bright red blood or blood clots in it. In some cases, severe hematuria can cause large clots that fill the bladder and block urine flow (urinary obstruction).      Hemorrhagic cystitis may also cause symptoms such as:  •An urgent or frequent need to pass urine.      •Pain when passing urine.      •Lower belly pain and fullness.      •Urinary obstruction.        How is this diagnosed?    This condition may be diagnosed based on:  •Your symptoms and medical history.      •A physical exam.    •Tests, such as:  •Urine tests to check for blood or signs of infection.      •Blood tests to check for signs of infection and a low red blood cell count due to bleeding.      •Imaging tests of the bladder, such as ultrasound, CT scan, or MRI.      •A procedure to examine the inside of your bladder using a flexible scope (cystoscopy).          How is this treated?    Treatment depends on the cause of the condition and how severe the bleeding is. If you are being treated with chemotherapy drugs, you may be given other medicines to reduce the risk for this condition during treatment. Other treatments may include:  •Removing your exposure to substances that are causing the condition, such as a chemical toxin or a medicine.      •Taking antibiotic or antiviral medicine if the condition is caused by an infection.      You may also be treated for hematuria. This can include:  •Fluids (hydration), bed rest, and observation. These methods may be all that is needed if you are able to pass urine and have no blood clots.      •Placing a flexible tube (catheter) into the bladder to continuously flush out (irrigate) the bladder with sterile saline solution. This may be needed if clots are passing or if bleeding is continuing.      •Medicine to reduce bleeding.      •A cystoscopy to remove clots if clots are filling the bladder. This may also include a procedure to stop bleeding (coagulation).      •A transfusion to replace blood loss.      You may need surgery to stop bleeding or to remove the bladder if other treatments have not helped.      Follow these instructions at home:     •If you had surgery, your health care provider will give you instructions for taking care of yourself at home after your procedure. Follow these instructions carefully.      •Take over-the-counter and prescription medicines only as told by your health care provider.      •If you were prescribed an antibiotic medicine, take it as told by your health care provider. Do not stop using the antibiotic even if you start to feel better.      •Return to your normal activities as told by your health care provider. Ask your health care provider what activities are safe for you.      •Drink enough fluid to keep your urine pale yellow.      •Keep all follow-up visits as told by your health care provider. This is important.        Contact a health care provider if you have:    •Chills or a fever.      •Blood in your urine.      •An urgent or frequent need to pass urine.      •Pain when passing urine.        Get help right away if you:    •Have bright red blood or clots in your urine.      •Are unable to pass urine.        Summary    •Hemorrhagic cystitis is bleeding caused by damage to the inner lining of your bladder.      •Hemorrhagic cystitis may make it difficult or painful to pass urine.      •This condition may be caused by damage from infections, radiation therapy, or chemotherapy drugs.      •Blood in the urine (hematuria) is the main sign of hemorrhagic cystitis. Hematuria can be very mild and involve microscopic bleeding that does not change the color of urine. It can also be severe and include passing urine with bright red blood or blood clots in it.      •Treatment for hemorrhagic cystitis depends on the cause and severity of the condition. In most cases, the condition will clear up with supportive care that may include rest, fluids, and antibiotics, along with removing exposure to the cause. Other treatments may be needed in more serious cases.      This information is not intended to replace advice given to you by your health care provider. Make sure you discuss any questions you have with your health care provider.

## 2022-10-25 NOTE — ED PROVIDER NOTE - ATTENDING APP SHARED VISIT CONTRIBUTION OF CARE
56 yo f past medical history hld, gerd, migraines, s/p hysterectomy presents for diffuse severe abd and hematuria. no fever chills, cp sob n/v/d/c. no ext pain or numbness/ weakness. exam as above. plan: labs, ct, us, symptom relief prn, reassess.

## 2022-10-25 NOTE — ED PROVIDER NOTE - PATIENT PORTAL LINK FT
You can access the FollowMyHealth Patient Portal offered by [facility] by registering at the following website: [email or web address]. By joining [employer]’s FollowMyHealth portal, you will also be able to view your health information using other applications (apps) compatible with our system.

## 2022-10-25 NOTE — ED PROVIDER NOTE - CLINICAL SUMMARY MEDICAL DECISION MAKING FREE TEXT BOX
57 y.o. female w/ PMHx HLD, GERD, and migraines and PSHx of hysterectomy (2003) presents to ED w/ c/o abdominal pain and hematuria. Given HPI, ROS, and Exam findings, will obtain labs and imaging to r/o SBO and eval for nephrolithiasis vs. cholecystitis vs. pyelonephritis.  Will provides meds for pain and symptom relief and reassess.

## 2022-10-25 NOTE — ED PROVIDER NOTE - PHYSICAL EXAMINATION
GEN: Pt in NAD, uncomfortable appearing, A&O x3.  HEAD:  Head NCAT.  Neck supple FROM.   EYES: Sclera white w/o injection.   ENT: No nasal d/c.  MMM.    RESP: CTA b/l, no wheezes, rales, or rhonchi.   CARDIAC: RRR, clear distinct S1, S2, no appreciable murmurs.  ABD: Abdomen soft, diffusely tender w/ max ttp at RUQ and suprapubically.  +R. CVAT.  VASC: 2+ radial and dorsalis pedis pulses b/l.   SKIN: No ecchymoses on trunk.  Skin color normal for age and race.  No skin tenting.

## 2022-10-30 NOTE — ED POST DISCHARGE NOTE - RESULT SUMMARY
UCX : E. Coli 10,000-49,000CFU/ml Patient discharged home with a prescription for Cefadroxil which is resistant. Patient contact # [phone] and  messages left with Call Back  P.A. number and hours for return call back.

## 2022-11-28 ENCOUNTER — APPOINTMENT (OUTPATIENT)
Dept: UROLOGY | Facility: CLINIC | Age: 57
End: 2022-11-28

## 2022-11-28 DIAGNOSIS — Z78.9 OTHER SPECIFIED HEALTH STATUS: ICD-10-CM

## 2022-11-28 DIAGNOSIS — R32 UNSPECIFIED URINARY INCONTINENCE: ICD-10-CM

## 2022-11-28 PROCEDURE — 99204 OFFICE O/P NEW MOD 45 MIN: CPT

## 2022-11-28 NOTE — PHYSICAL EXAM
[General Appearance - Well Developed] : well developed [General Appearance - Well Nourished] : well nourished [Normal Appearance] : normal appearance [Well Groomed] : well groomed [General Appearance - In No Acute Distress] : no acute distress [Edema] : no peripheral edema [Respiration, Rhythm And Depth] : normal respiratory rhythm and effort [Exaggerated Use Of Accessory Muscles For Inspiration] : no accessory muscle use [Abdomen Soft] : soft [Abdomen Tenderness] : non-tender [Costovertebral Angle Tenderness] : no ~M costovertebral angle tenderness [FreeTextEntry1] : pvr- 19 ml  [Normal Station and Gait] : the gait and station were normal for the patient's age [] : no rash [No Focal Deficits] : no focal deficits [Oriented To Time, Place, And Person] : oriented to person, place, and time [Affect] : the affect was normal [Mood] : the mood was normal [Not Anxious] : not anxious [No Palpable Adenopathy] : no palpable adenopathy

## 2022-11-28 NOTE — HISTORY OF PRESENT ILLNESS
[FreeTextEntry1] : patient here with  to help with interpretation \par patient with hx of CAM s/p sling 2016 \par  ( vag) \par states no longer with CAM but notes UTIs and microhematuria since then \par recentl E coli UTI treated \par hx of AWILDA : kidneys ok and then CT due to hematuria : kidney no stone or hydro btu bladder with ? cystitis \par creat 0.51\par denies bowel issues\par takes avg water \par not sexually active \par here for further eval:

## 2022-11-28 NOTE — ASSESSMENT
[FreeTextEntry1] : patient here with  to help with interpretation \par patient with hx of CAM s/p sling  \par  ( vag) \par states no longer with CAM but notes UTIs and microhematuria since then \par recentl E coli UTI treated \par hx of AWILDA : kidneys ok and then CT due to hematuria : kidney no stone or hydro btu bladder with ? cystitis \par creat 0.51\par denies bowel issues\par takes avg water \par not sexually active \par here for further eval:\par 1- check voided urine\par 2- rtc for CYSTO keerthi due to slijng surgery \par 3- pvr- low

## 2022-11-30 LAB
APPEARANCE: CLEAR
BACTERIA UR CULT: NORMAL
BACTERIA: NEGATIVE
BILIRUBIN URINE: NEGATIVE
BLOOD URINE: ABNORMAL
COLOR: YELLOW
GLUCOSE QUALITATIVE U: NEGATIVE
HYALINE CASTS: 1 /LPF
KETONES URINE: NEGATIVE
LEUKOCYTE ESTERASE URINE: NEGATIVE
MICROSCOPIC-UA: NORMAL
NITRITE URINE: NEGATIVE
PH URINE: 5.5
PROTEIN URINE: NORMAL
RED BLOOD CELLS URINE: 9 /HPF
SPECIFIC GRAVITY URINE: 1.02
SQUAMOUS EPITHELIAL CELLS: 1 /HPF
UROBILINOGEN URINE: NORMAL
WHITE BLOOD CELLS URINE: 1 /HPF

## 2022-12-12 ENCOUNTER — APPOINTMENT (OUTPATIENT)
Dept: UROLOGY | Facility: CLINIC | Age: 57
End: 2022-12-12

## 2022-12-12 VITALS
OXYGEN SATURATION: 96 % | TEMPERATURE: 98 F | RESPIRATION RATE: 18 BRPM | HEART RATE: 62 BPM | DIASTOLIC BLOOD PRESSURE: 93 MMHG | SYSTOLIC BLOOD PRESSURE: 156 MMHG

## 2022-12-12 DIAGNOSIS — Z87.898 PERSONAL HISTORY OF OTHER SPECIFIED CONDITIONS: ICD-10-CM

## 2022-12-12 PROCEDURE — 52000 CYSTOURETHROSCOPY: CPT

## 2023-02-15 ENCOUNTER — APPOINTMENT (OUTPATIENT)
Dept: UROLOGY | Facility: CLINIC | Age: 58
End: 2023-02-15
Payer: MEDICAID

## 2023-02-15 DIAGNOSIS — R31.29 OTHER MICROSCOPIC HEMATURIA: ICD-10-CM

## 2023-02-15 PROCEDURE — 52000 CYSTOURETHROSCOPY: CPT

## 2023-08-07 ENCOUNTER — APPOINTMENT (OUTPATIENT)
Dept: UROLOGY | Facility: CLINIC | Age: 58
End: 2023-08-07
Payer: MEDICAID

## 2023-08-07 VITALS
OXYGEN SATURATION: 97 % | HEART RATE: 78 BPM | DIASTOLIC BLOOD PRESSURE: 90 MMHG | SYSTOLIC BLOOD PRESSURE: 152 MMHG | HEIGHT: 65 IN

## 2023-08-07 DIAGNOSIS — R10.9 UNSPECIFIED ABDOMINAL PAIN: ICD-10-CM

## 2023-08-07 DIAGNOSIS — Z87.440 PERSONAL HISTORY OF URINARY (TRACT) INFECTIONS: ICD-10-CM

## 2023-08-07 PROCEDURE — 99214 OFFICE O/P EST MOD 30 MIN: CPT

## 2023-08-07 NOTE — HISTORY OF PRESENT ILLNESS
[FreeTextEntry1] : now here with blessinger  c/o lower right abd -- radiates to knee - for past 2 m  worse past 2 weeks admits to alot of water intake  associated with intermittent voids, no hematuria, some dysuria  no back pain but mostly right flank no fever or N/V bowel habits - occas constipation  last seen for cysto for microhematuria eval with CT ( neg upper tracts ) and AWILDA  here for further eval :

## 2023-08-07 NOTE — PHYSICAL EXAM
[General Appearance - Well Developed] : well developed [General Appearance - Well Nourished] : well nourished [Normal Appearance] : normal appearance [Well Groomed] : well groomed [General Appearance - In No Acute Distress] : no acute distress [Abdomen Soft] : soft [Abdomen Tenderness] : non-tender [Costovertebral Angle Tenderness] : no ~M costovertebral angle tenderness [FreeTextEntry1] : p r- 90 ml- pain right lflank ad below right ing ligmanet

## 2023-08-07 NOTE — ASSESSMENT
[FreeTextEntry1] : now here with blessinger  c/o lower right abd -- radiates to knee - for past 2 m  worse past 2 weeks admits to alot of water intake  associated with intermittent voids, no hematuria, some dysuria  no back pain but mostly right flank no fever or N/V bowel habits - occas constipation  last seen for cysto for microhematuria eval with CT ( neg upper tracts ) and AWILDA  here for further eval : 1- check urine  2- AWILDA and pelvic sono  3-if above normal - to see PCP- has not seen yet 4- ? femoral hernia

## 2023-08-09 LAB
APPEARANCE: CLEAR
BACTERIA UR CULT: NORMAL
BACTERIA: NEGATIVE /HPF
BILIRUBIN URINE: NEGATIVE
BLOOD URINE: ABNORMAL
CAST: 0 /LPF
COLOR: YELLOW
EPITHELIAL CELLS: 1 /HPF
GLUCOSE QUALITATIVE U: NEGATIVE MG/DL
KETONES URINE: NEGATIVE MG/DL
LEUKOCYTE ESTERASE URINE: NEGATIVE
MICROSCOPIC-UA: NORMAL
NITRITE URINE: NEGATIVE
PH URINE: 5.5
PROTEIN URINE: NEGATIVE MG/DL
RED BLOOD CELLS URINE: 13 /HPF
SPECIFIC GRAVITY URINE: 1.02
UROBILINOGEN URINE: 0.2 MG/DL
WHITE BLOOD CELLS URINE: 0 /HPF

## 2023-09-20 ENCOUNTER — NON-APPOINTMENT (OUTPATIENT)
Age: 58
End: 2023-09-20

## 2024-04-16 NOTE — ED PROVIDER NOTE - DISCHARGE DATE
Introduction Text (Please End With A Colon): The following procedure was deferred: Size Of Lesion In Cm (Optional): 1.1 Detail Level: Detailed X Size Of Lesion In Cm (Optional): 1.4 Instructions (Optional): With Klever 25-Oct-2022

## 2024-11-26 ENCOUNTER — EMERGENCY (EMERGENCY)
Facility: HOSPITAL | Age: 59
LOS: 1 days | Discharge: HOME | End: 2024-11-26
Attending: EMERGENCY MEDICINE | Admitting: EMERGENCY MEDICINE
Payer: MEDICAID

## 2024-11-26 VITALS
RESPIRATION RATE: 16 BRPM | WEIGHT: 190.04 LBS | TEMPERATURE: 98 F | SYSTOLIC BLOOD PRESSURE: 128 MMHG | HEART RATE: 91 BPM | OXYGEN SATURATION: 98 % | DIASTOLIC BLOOD PRESSURE: 69 MMHG

## 2024-11-26 VITALS
TEMPERATURE: 98 F | OXYGEN SATURATION: 100 % | HEART RATE: 72 BPM | DIASTOLIC BLOOD PRESSURE: 88 MMHG | SYSTOLIC BLOOD PRESSURE: 138 MMHG | RESPIRATION RATE: 16 BRPM

## 2024-11-26 LAB
ALBUMIN SERPL ELPH-MCNC: 4.2 G/DL — SIGNIFICANT CHANGE UP (ref 3.3–5)
ALP SERPL-CCNC: 46 U/L — SIGNIFICANT CHANGE UP (ref 40–120)
ALT FLD-CCNC: 17 U/L — SIGNIFICANT CHANGE UP (ref 4–33)
ANION GAP SERPL CALC-SCNC: 13 MMOL/L — SIGNIFICANT CHANGE UP (ref 7–14)
APPEARANCE UR: CLEAR — SIGNIFICANT CHANGE UP
APTT BLD: 37.1 SEC — HIGH (ref 24.5–35.6)
AST SERPL-CCNC: 19 U/L — SIGNIFICANT CHANGE UP (ref 4–32)
BACTERIA # UR AUTO: NEGATIVE /HPF — SIGNIFICANT CHANGE UP
BASOPHILS # BLD AUTO: 0.03 K/UL — SIGNIFICANT CHANGE UP (ref 0–0.2)
BASOPHILS NFR BLD AUTO: 0.5 % — SIGNIFICANT CHANGE UP (ref 0–2)
BILIRUB SERPL-MCNC: 0.2 MG/DL — SIGNIFICANT CHANGE UP (ref 0.2–1.2)
BILIRUB UR-MCNC: NEGATIVE — SIGNIFICANT CHANGE UP
BLD GP AB SCN SERPL QL: NEGATIVE — SIGNIFICANT CHANGE UP
BUN SERPL-MCNC: 12 MG/DL — SIGNIFICANT CHANGE UP (ref 7–23)
CALCIUM SERPL-MCNC: 9 MG/DL — SIGNIFICANT CHANGE UP (ref 8.4–10.5)
CAST: 0 /LPF — SIGNIFICANT CHANGE UP (ref 0–4)
CHLORIDE SERPL-SCNC: 104 MMOL/L — SIGNIFICANT CHANGE UP (ref 98–107)
CO2 SERPL-SCNC: 25 MMOL/L — SIGNIFICANT CHANGE UP (ref 22–31)
COLOR SPEC: YELLOW — SIGNIFICANT CHANGE UP
CREAT SERPL-MCNC: 0.54 MG/DL — SIGNIFICANT CHANGE UP (ref 0.5–1.3)
DIFF PNL FLD: ABNORMAL
EGFR: 105 ML/MIN/1.73M2 — SIGNIFICANT CHANGE UP
EOSINOPHIL # BLD AUTO: 0.11 K/UL — SIGNIFICANT CHANGE UP (ref 0–0.5)
EOSINOPHIL NFR BLD AUTO: 1.7 % — SIGNIFICANT CHANGE UP (ref 0–6)
GLUCOSE SERPL-MCNC: 97 MG/DL — SIGNIFICANT CHANGE UP (ref 70–99)
GLUCOSE UR QL: NEGATIVE MG/DL — SIGNIFICANT CHANGE UP
HCT VFR BLD CALC: 39.6 % — SIGNIFICANT CHANGE UP (ref 34.5–45)
HGB BLD-MCNC: 12.9 G/DL — SIGNIFICANT CHANGE UP (ref 11.5–15.5)
IANC: 3.84 K/UL — SIGNIFICANT CHANGE UP (ref 1.8–7.4)
IMM GRANULOCYTES NFR BLD AUTO: 0.2 % — SIGNIFICANT CHANGE UP (ref 0–0.9)
INR BLD: 0.97 RATIO — SIGNIFICANT CHANGE UP (ref 0.85–1.16)
KETONES UR-MCNC: NEGATIVE MG/DL — SIGNIFICANT CHANGE UP
LEUKOCYTE ESTERASE UR-ACNC: NEGATIVE — SIGNIFICANT CHANGE UP
LIDOCAIN IGE QN: 36 U/L — SIGNIFICANT CHANGE UP (ref 7–60)
LYMPHOCYTES # BLD AUTO: 1.98 K/UL — SIGNIFICANT CHANGE UP (ref 1–3.3)
LYMPHOCYTES # BLD AUTO: 30.7 % — SIGNIFICANT CHANGE UP (ref 13–44)
MCHC RBC-ENTMCNC: 28.2 PG — SIGNIFICANT CHANGE UP (ref 27–34)
MCHC RBC-ENTMCNC: 32.6 G/DL — SIGNIFICANT CHANGE UP (ref 32–36)
MCV RBC AUTO: 86.7 FL — SIGNIFICANT CHANGE UP (ref 80–100)
MONOCYTES # BLD AUTO: 0.48 K/UL — SIGNIFICANT CHANGE UP (ref 0–0.9)
MONOCYTES NFR BLD AUTO: 7.4 % — SIGNIFICANT CHANGE UP (ref 2–14)
NEUTROPHILS # BLD AUTO: 3.84 K/UL — SIGNIFICANT CHANGE UP (ref 1.8–7.4)
NEUTROPHILS NFR BLD AUTO: 59.5 % — SIGNIFICANT CHANGE UP (ref 43–77)
NITRITE UR-MCNC: NEGATIVE — SIGNIFICANT CHANGE UP
NRBC # BLD: 0 /100 WBCS — SIGNIFICANT CHANGE UP (ref 0–0)
NRBC # FLD: 0 K/UL — SIGNIFICANT CHANGE UP (ref 0–0)
PH UR: 6 — SIGNIFICANT CHANGE UP (ref 5–8)
PLATELET # BLD AUTO: 255 K/UL — SIGNIFICANT CHANGE UP (ref 150–400)
POTASSIUM SERPL-MCNC: 4 MMOL/L — SIGNIFICANT CHANGE UP (ref 3.5–5.3)
POTASSIUM SERPL-SCNC: 4 MMOL/L — SIGNIFICANT CHANGE UP (ref 3.5–5.3)
PROT SERPL-MCNC: 7.1 G/DL — SIGNIFICANT CHANGE UP (ref 6–8.3)
PROT UR-MCNC: NEGATIVE MG/DL — SIGNIFICANT CHANGE UP
PROTHROM AB SERPL-ACNC: 11.5 SEC — SIGNIFICANT CHANGE UP (ref 9.9–13.4)
RBC # BLD: 4.57 M/UL — SIGNIFICANT CHANGE UP (ref 3.8–5.2)
RBC # FLD: 12.8 % — SIGNIFICANT CHANGE UP (ref 10.3–14.5)
RBC CASTS # UR COMP ASSIST: 1 /HPF — SIGNIFICANT CHANGE UP (ref 0–4)
RH IG SCN BLD-IMP: POSITIVE — SIGNIFICANT CHANGE UP
SODIUM SERPL-SCNC: 142 MMOL/L — SIGNIFICANT CHANGE UP (ref 135–145)
SP GR SPEC: 1.01 — SIGNIFICANT CHANGE UP (ref 1–1.03)
SQUAMOUS # UR AUTO: 0 /HPF — SIGNIFICANT CHANGE UP (ref 0–5)
UROBILINOGEN FLD QL: 0.2 MG/DL — SIGNIFICANT CHANGE UP (ref 0.2–1)
WBC # BLD: 6.45 K/UL — SIGNIFICANT CHANGE UP (ref 3.8–10.5)
WBC # FLD AUTO: 6.45 K/UL — SIGNIFICANT CHANGE UP (ref 3.8–10.5)
WBC UR QL: 0 /HPF — SIGNIFICANT CHANGE UP (ref 0–5)

## 2024-11-26 PROCEDURE — 74177 CT ABD & PELVIS W/CONTRAST: CPT | Mod: 26,MC

## 2024-11-26 PROCEDURE — 93010 ELECTROCARDIOGRAM REPORT: CPT

## 2024-11-26 PROCEDURE — 76857 US EXAM PELVIC LIMITED: CPT | Mod: 26

## 2024-11-26 PROCEDURE — 99285 EMERGENCY DEPT VISIT HI MDM: CPT

## 2024-11-26 PROCEDURE — 76830 TRANSVAGINAL US NON-OB: CPT | Mod: 26

## 2024-11-26 RX ORDER — ACETAMINOPHEN 500 MG
1000 TABLET ORAL ONCE
Refills: 0 | Status: COMPLETED | OUTPATIENT
Start: 2024-11-26 | End: 2024-11-26

## 2024-11-26 RX ADMIN — Medication 400 MILLIGRAM(S): at 21:19

## 2024-11-26 NOTE — ED PROVIDER NOTE - PROGRESS NOTE DETAILS
KEVIN Cantu: Received signout from  patient pending CT results.  CT with no acute findings.  Ultrasound of the pelvis and vaginal tiny bilateral ovarian cysts with no other acute findings.  Labs with no acute findings.  Patient reports improvement of symptoms at this time labs and imaging discussed and reviewed with patient strict return precautions given upon discharge.

## 2024-11-26 NOTE — ED ADULT NURSE NOTE - NSFALLUNIVINTERV_ED_ALL_ED
Bed/Stretcher in lowest position, wheels locked, appropriate side rails in place/Call bell, personal items and telephone in reach/Instruct patient to call for assistance before getting out of bed/chair/stretcher/Non-slip footwear applied when patient is off stretcher/[place] to call system/Physically safe environment - no spills, clutter or unnecessary equipment/Purposeful proactive rounding/Room/bathroom lighting operational, light cord in reach

## 2024-11-26 NOTE — ED PROVIDER NOTE - CLINICAL SUMMARY MEDICAL DECISION MAKING FREE TEXT BOX
59F, PMHx HLD, prior hysterectomy presents to ED complaining of pelvic pain.  Patient states that for the past 2 weeks, she has had worsening pelvic pain, with radiation to right flank.  Patient endorsing dysuria, feeling of incomplete voiding.  Patient also endorsing postprandial abdominal pain, located in same region.  Patient also endorsing intermittent vaginal spotting.  Patient states that she had similar issue 1 year ago, underwent cystoscopy by urology, with no obvious pathology notified.  Denies foul-smelling/bloody urine.  Denies vomiting, endorses vomiting.  Denies melena, hematochezia.  Denies fever, cough, chills, chest pain.  Endorses mild shortness of breath this morning which has resolved.  Denies diarrhea, constipation, trauma.    Vitals:  /69  Resp 16 unlabored  HR 91  Temp 97.7 oral  SpO2 98 room air    Presentation potentially suggest multiple ongoing conditions.  Patient with prior hysterectomy, which lowers concern but does not alleviate concern for endometrial bleeding.  Unknown if cervix was also removed in that procedure.  Dysuria, incomplete voiding may suggest renal lithiasis versus UTI versus mechanical obstruction.  However, the symptoms do not explain postprandial pain.  Given broad differential, will acquire labs, transvaginal ultrasound, CT abdomen pelvis with IV contrast, UA.  Pain control.  Dispo per workup and reassessment.

## 2024-11-26 NOTE — ED PROVIDER NOTE - PHYSICAL EXAMINATION
Gen: AAOx3, non-toxic  HEENT: NCAT. PEERLA, EOMI, oral mucosa moist, normal conjunctiva  Lung: CTAB, no respiratory distress, no wheezes/rhonchi/rales B/L, speaking in full sentences  CV: RRR, no murmurs, rubs or gallops  Abd: soft, TTP w/ guarding R/LLQs, no rebound, +CVA tenderness R.   MSK: no visible deformities  Neuro: No focal sensory or motor deficits  Skin: Warm, well perfused, no rash  Psych: normal affect.

## 2024-11-26 NOTE — ED PROVIDER NOTE - PATIENT PORTAL LINK FT
You can access the FollowMyHealth Patient Portal offered by [facility] by registering at the following website: [email or web address]. By joining [employer]’s FollowMyHealth portal, you will also be able to view your health information using other applications (apps) compatible with our system.

## 2024-11-26 NOTE — ED ADULT NURSE NOTE - NSICDXPASTMEDICALHX_GEN_ALL_CORE_FT
Laterality Date    OTHER SURGICAL HISTORY Left 08/22/2016    CLEFT DEFORMITY REPAIR-EARLOBE VIA EXCISION/RECONSTRUCTION    WISDOM TOOTH EXTRACTION       Social History     Socioeconomic History    Marital status: Single     Spouse name: Not on file    Number of children: Not on file    Years of education: Not on file    Highest education level: Not on file   Occupational History    Not on file   Social Needs    Financial resource strain: Not on file    Food insecurity:     Worry: Not on file     Inability: Not on file    Transportation needs:     Medical: Not on file     Non-medical: Not on file   Tobacco Use    Smoking status: Never Smoker    Smokeless tobacco: Never Used   Substance and Sexual Activity    Alcohol use: No    Drug use: No    Sexual activity: Never   Lifestyle    Physical activity:     Days per week: Not on file     Minutes per session: Not on file    Stress: Not on file   Relationships    Social connections:     Talks on phone: Not on file     Gets together: Not on file     Attends [employer] service: Not on file     Active member of club or organization: Not on file     Attends meetings of clubs or organizations: Not on file     Relationship status: Not on file    Intimate partner violence:     Fear of current or ex partner: Not on file     Emotionally abused: Not on file     Physically abused: Not on file     Forced sexual activity: Not on file   Other Topics Concern    Not on file   Social History Narrative    Not on file     History reviewed. No pertinent family history. Allergies   Allergen Reactions    Antihistamines, Diphenhydramine-Type Other (See Comments)     Hyperactivity/jonathan.  Corticosteroids Anxiety     Hyperactivity/jonathan.  Guaifenesin & Derivatives Other (See Comments)     Hyperactivity/jonathan.      Current Outpatient Medications   Medication Sig Dispense Refill    CALCIUM CITRATE PO Take by mouth      levothyroxine (SYNTHROID) 112 MCG tablet Take 1 seasonality     3. Sore throat  POCT rapid strep A         Orders Placed This Encounter   Procedures    POCT rapid strep A     Orders Placed This Encounter   Medications    azithromycin (ZITHROMAX) 250 MG tablet     Sig: Take 1 tablet by mouth See Admin Instructions for 5 days 500mg on day 1 followed by 250mg on days 2 - 5     Dispense:  6 tablet     Refill:  0    benzonatate (TESSALON) 200 MG capsule     Sig: Take 1 capsule by mouth 3 times daily as needed for Cough     Dispense:  30 capsule     Refill:  0     Medications Discontinued During This Encounter   Medication Reason    MELATONIN PO LIST CLEANUP     Return if symptoms worsen or fail to improve.     Cathy Luis PA-C PAST MEDICAL HISTORY:  Chronic Sinus Infection     GERD (gastroesophageal reflux disease)     HLD (Hyperlipidemia)     Incontinence Stress and urge    Migraine headache

## 2024-11-26 NOTE — ED PROVIDER NOTE - NSFOLLOWUPINSTRUCTIONS_ED_ALL_ED_FT
You were seen in our department for Pelvic Pain   Follow up with your Primary Care Doctor  in 48-72 hours for further monitoring.    Please take tylenol/ibuprofen as needed for pain.     if you develop any chest pain, dizziness, high fevers, weakness, numbness, tingling, vision changes, or any worsening symptoms return to our ED for evaluation.

## 2024-11-26 NOTE — ED ADULT NURSE NOTE - OBJECTIVE STATEMENT
A&OX4, ambulatory, h/o hysterectomy, bladder surgery. Patient is coming to ED in regards to pelvic pain x 2 weeks. Patient endorses pain radiating to right  flank, worsen with movement, chills. spotting on toilet paper. Patient denies fevers, medication taken prior to coming in. 20g IV placed to LAC, will continue to monitor.

## 2024-11-26 NOTE — ED PROVIDER NOTE - ATTENDING CONTRIBUTION TO CARE
Patient well-appearing no acute distress HEENT unremarkable no jaundice noted heart sounds S1-S2 lungs clear abdomen soft tender right upper quadrant and right CVA tenderness no guarding no rebound neuro motor 5/5.  Gait normal.

## 2024-11-26 NOTE — ED ADULT TRIAGE NOTE - CHIEF COMPLAINT QUOTE
ambulatory to triage, c/o lower pelvic pain X 2 weeks. endorsing mild vaginal bleeding. denies use of blood thinners. Hx of GERD, HLD, hysterectomy